# Patient Record
Sex: FEMALE | Race: WHITE
[De-identification: names, ages, dates, MRNs, and addresses within clinical notes are randomized per-mention and may not be internally consistent; named-entity substitution may affect disease eponyms.]

---

## 2021-12-13 ENCOUNTER — HOSPITAL ENCOUNTER (OUTPATIENT)
Age: 61
End: 2021-12-13
Payer: MEDICARE

## 2021-12-13 DIAGNOSIS — F17.210: Primary | ICD-10-CM

## 2021-12-13 DIAGNOSIS — Z12.2: ICD-10-CM

## 2021-12-13 PROCEDURE — 71271 CT THORAX LUNG CANCER SCR C-: CPT

## 2021-12-22 ENCOUNTER — HOSPITAL ENCOUNTER (OUTPATIENT)
Age: 61
End: 2021-12-22
Payer: MEDICARE

## 2021-12-22 DIAGNOSIS — Z12.31: Primary | ICD-10-CM

## 2021-12-22 PROCEDURE — 77063 BREAST TOMOSYNTHESIS BI: CPT

## 2021-12-22 PROCEDURE — 77067 SCR MAMMO BI INCL CAD: CPT

## 2022-07-08 ENCOUNTER — HOSPITAL ENCOUNTER (OUTPATIENT)
Dept: HOSPITAL 100 - LAB | Age: 62
Discharge: HOME | End: 2022-07-08
Payer: COMMERCIAL

## 2022-07-08 DIAGNOSIS — G62.9: Primary | ICD-10-CM

## 2022-07-08 LAB
ALANINE AMINOTRANSFER ALT/SGPT: 19 U/L (ref 13–56)
ALBUMIN SERPL-MCNC: 4 G/DL (ref 3.2–5)
ALKALINE PHOSPHATASE: 68 U/L (ref 45–117)
ANION GAP: 1 (ref 5–15)
AST(SGOT): 13 U/L (ref 15–37)
BUN SERPL-MCNC: 7 MG/DL (ref 7–18)
BUN/CREAT RATIO: 9.1 RATIO (ref 10–20)
CALCIUM SERPL-MCNC: 9.1 MG/DL (ref 8.5–10.1)
CARBON DIOXIDE: 33 MMOL/L (ref 21–32)
CHLORIDE: 105 MMOL/L (ref 98–107)
CHOLEST SERPL-MCNC: 181 MG/DL
DEPRECATED RDW RBC: 40 FL (ref 35.1–43.9)
ERYTHROCYTE [DISTWIDTH] IN BLOOD: 11.7 % (ref 11.6–14.6)
EST GLOM FILT RATE - AFR AMER: 97 ML/MIN (ref 60–?)
FOLATES, (FOLIC ACID): 29.2 NG/ML (ref 3.1–55.4)
GLOBULIN: 2.8 G/DL (ref 2.2–4.2)
GLUCOSE: 99 MG/DL (ref 74–106)
HCT VFR BLD AUTO: 44.1 % (ref 37–47)
HEMOGLOBIN: 15.1 G/DL (ref 12–15)
HGB BLD-MCNC: 15.1 G/DL (ref 12–15)
MCV RBC: 92.8 FL (ref 81–99)
MEAN CORP HGB CONC: 34.2 G/DL (ref 32–36)
MEAN PLATELET VOL.: 10.2 FL (ref 6.2–12)
PLATELET # BLD: 246 K/MM3 (ref 150–450)
PLATELET COUNT: 246 K/MM3 (ref 150–450)
POTASSIUM: 4.3 MMOL/L (ref 3.5–5.1)
RBC # BLD AUTO: 4.75 M/MM3 (ref 4.2–5.4)
RBC DISTRIBUTION WIDTH CV: 11.7 % (ref 11.6–14.6)
RBC DISTRIBUTION WIDTH SD: 40 FL (ref 35.1–43.9)
TRIGLYCERIDES: 89 MG/DL
VLDLC SERPL-MCNC: 18 MG/DL (ref 5–40)
WBC # BLD AUTO: 5.8 K/MM3 (ref 4.4–11)
WHITE BLOOD COUNT: 5.8 K/MM3 (ref 4.4–11)

## 2022-07-08 PROCEDURE — 80061 LIPID PANEL: CPT

## 2022-07-08 PROCEDURE — 85027 COMPLETE CBC AUTOMATED: CPT

## 2022-07-08 PROCEDURE — 81241 F5 GENE: CPT

## 2022-07-08 PROCEDURE — 82746 ASSAY OF FOLIC ACID SERUM: CPT

## 2022-07-08 PROCEDURE — 80053 COMPREHEN METABOLIC PANEL: CPT

## 2022-07-08 PROCEDURE — 84425 ASSAY OF VITAMIN B-1: CPT

## 2022-07-08 PROCEDURE — 83883 ASSAY NEPHELOMETRY NOT SPEC: CPT

## 2022-07-08 PROCEDURE — 36415 COLL VENOUS BLD VENIPUNCTURE: CPT

## 2022-07-15 LAB — VITAMIN B1, THIAMINE: 213.1 NMOL/L (ref 66.5–200)

## 2022-07-25 ENCOUNTER — HOSPITAL ENCOUNTER (OUTPATIENT)
Dept: HOSPITAL 100 - MRI | Age: 62
Discharge: HOME | End: 2022-07-25
Payer: MEDICARE

## 2022-07-25 DIAGNOSIS — G25.0: Primary | ICD-10-CM

## 2022-07-25 DIAGNOSIS — Z86.79: ICD-10-CM

## 2022-07-25 PROCEDURE — A9575 INJ GADOTERATE MEGLUMI 0.1ML: HCPCS

## 2022-07-25 PROCEDURE — 70553 MRI BRAIN STEM W/O & W/DYE: CPT

## 2022-08-19 ENCOUNTER — HOSPITAL ENCOUNTER (EMERGENCY)
Age: 62
Discharge: HOME | End: 2022-08-19
Payer: MEDICARE

## 2022-08-19 VITALS
SYSTOLIC BLOOD PRESSURE: 118 MMHG | HEART RATE: 89 BPM | DIASTOLIC BLOOD PRESSURE: 97 MMHG | RESPIRATION RATE: 16 BRPM | TEMPERATURE: 98.06 F | OXYGEN SATURATION: 98 %

## 2022-08-19 VITALS
RESPIRATION RATE: 22 BRPM | OXYGEN SATURATION: 97 % | SYSTOLIC BLOOD PRESSURE: 119 MMHG | DIASTOLIC BLOOD PRESSURE: 86 MMHG | HEART RATE: 94 BPM | TEMPERATURE: 98.1 F

## 2022-08-19 VITALS
SYSTOLIC BLOOD PRESSURE: 119 MMHG | TEMPERATURE: 98.06 F | OXYGEN SATURATION: 97 % | RESPIRATION RATE: 22 BRPM | HEART RATE: 94 BPM | DIASTOLIC BLOOD PRESSURE: 86 MMHG

## 2022-08-19 VITALS — RESPIRATION RATE: 24 BRPM | HEART RATE: 106 BPM

## 2022-08-19 VITALS
DIASTOLIC BLOOD PRESSURE: 73 MMHG | RESPIRATION RATE: 18 BRPM | SYSTOLIC BLOOD PRESSURE: 121 MMHG | OXYGEN SATURATION: 93 % | HEART RATE: 93 BPM

## 2022-08-19 VITALS
HEART RATE: 89 BPM | OXYGEN SATURATION: 98 % | RESPIRATION RATE: 19 BRPM | DIASTOLIC BLOOD PRESSURE: 85 MMHG | SYSTOLIC BLOOD PRESSURE: 136 MMHG

## 2022-08-19 VITALS — OXYGEN SATURATION: 94 %

## 2022-08-19 VITALS — BODY MASS INDEX: 21 KG/M2

## 2022-08-19 VITALS — OXYGEN SATURATION: 96 %

## 2022-08-19 DIAGNOSIS — F41.1: ICD-10-CM

## 2022-08-19 DIAGNOSIS — R09.02: ICD-10-CM

## 2022-08-19 DIAGNOSIS — K58.9: ICD-10-CM

## 2022-08-19 DIAGNOSIS — Z79.899: ICD-10-CM

## 2022-08-19 DIAGNOSIS — F17.210: ICD-10-CM

## 2022-08-19 DIAGNOSIS — M51.36: ICD-10-CM

## 2022-08-19 DIAGNOSIS — M50.30: ICD-10-CM

## 2022-08-19 DIAGNOSIS — J44.1: Primary | ICD-10-CM

## 2022-08-19 DIAGNOSIS — M79.7: ICD-10-CM

## 2022-08-19 DIAGNOSIS — G62.9: ICD-10-CM

## 2022-08-19 DIAGNOSIS — Z86.73: ICD-10-CM

## 2022-08-19 DIAGNOSIS — U07.1: ICD-10-CM

## 2022-08-19 LAB
ANION GAP: 6 (ref 5–15)
BUN SERPL-MCNC: 8 MG/DL (ref 7–18)
BUN/CREAT RATIO: 10.9 RATIO (ref 10–20)
CALCIUM SERPL-MCNC: 9 MG/DL (ref 8.5–10.1)
CARBON DIOXIDE: 29 MMOL/L (ref 21–32)
CHLORIDE: 101 MMOL/L (ref 98–107)
D-DIMER QUANTITATIVE (DVT/PE): 0.42 FEU/UG/M (ref 0.27–0.49)
DEPRECATED RDW RBC: 40.1 FL (ref 35.1–43.9)
ERYTHROCYTE [DISTWIDTH] IN BLOOD: 11.9 % (ref 11.6–14.6)
EST GLOM FILT RATE - AFR AMER: 102 ML/MIN (ref 60–?)
ESTIMATED CREATININE CLEARANCE: 73.96 ML/MIN
GLUCOSE: 119 MG/DL (ref 74–106)
HCT VFR BLD AUTO: 42.5 % (ref 37–47)
HEMOGLOBIN: 14.9 G/DL (ref 12–15)
HGB BLD-MCNC: 14.9 G/DL (ref 12–15)
IMMATURE GRANULOCYTES COUNT: 0.02 X10^3/UL (ref 0–0)
MCV RBC: 92.4 FL (ref 81–99)
MEAN CORP HGB CONC: 35.1 G/DL (ref 32–36)
MEAN PLATELET VOL.: 10.4 FL (ref 6.2–12)
NRBC FLAGGED BY ANALYZER: 0 % (ref 0–5)
PLATELET # BLD: 182 K/MM3 (ref 150–450)
PLATELET COUNT: 182 K/MM3 (ref 150–450)
POTASSIUM: 4 MMOL/L (ref 3.5–5.1)
RBC # BLD AUTO: 4.6 M/MM3 (ref 4.2–5.4)
RBC DISTRIBUTION WIDTH CV: 11.9 % (ref 11.6–14.6)
RBC DISTRIBUTION WIDTH SD: 40.1 FL (ref 35.1–43.9)
TROPONIN-I HS: 4 PG/ML (ref 3–54)
WBC # BLD AUTO: 4.6 K/MM3 (ref 4.4–11)
WHITE BLOOD COUNT: 4.6 K/MM3 (ref 4.4–11)

## 2022-08-19 PROCEDURE — 85379 FIBRIN DEGRADATION QUANT: CPT

## 2022-08-19 PROCEDURE — 93005 ELECTROCARDIOGRAM TRACING: CPT

## 2022-08-19 PROCEDURE — 85025 COMPLETE CBC W/AUTO DIFF WBC: CPT

## 2022-08-19 PROCEDURE — 71045 X-RAY EXAM CHEST 1 VIEW: CPT

## 2022-08-19 PROCEDURE — 96361 HYDRATE IV INFUSION ADD-ON: CPT

## 2022-08-19 PROCEDURE — 80048 BASIC METABOLIC PNL TOTAL CA: CPT

## 2022-08-19 PROCEDURE — 94640 AIRWAY INHALATION TREATMENT: CPT

## 2022-08-19 PROCEDURE — 99285 EMERGENCY DEPT VISIT HI MDM: CPT

## 2022-08-19 PROCEDURE — 84484 ASSAY OF TROPONIN QUANT: CPT

## 2022-08-19 PROCEDURE — A4216 STERILE WATER/SALINE, 10 ML: HCPCS

## 2022-08-19 PROCEDURE — 96374 THER/PROPH/DIAG INJ IV PUSH: CPT

## 2023-01-27 ENCOUNTER — HOSPITAL ENCOUNTER (OUTPATIENT)
Dept: HOSPITAL 100 - BFHLAB | Age: 63
Discharge: HOME | End: 2023-01-27
Payer: MEDICARE

## 2023-01-27 DIAGNOSIS — F17.200: ICD-10-CM

## 2023-01-27 DIAGNOSIS — J44.9: ICD-10-CM

## 2023-01-27 DIAGNOSIS — Z00.00: Primary | ICD-10-CM

## 2023-01-27 DIAGNOSIS — E55.9: ICD-10-CM

## 2023-01-27 LAB
ALANINE AMINOTRANSFER ALT/SGPT: 28 U/L (ref 13–56)
ALBUMIN SERPL-MCNC: 4.1 G/DL (ref 3.2–5)
ALKALINE PHOSPHATASE: 70 U/L (ref 45–117)
ANION GAP: 7 (ref 5–15)
AST(SGOT): 18 U/L (ref 15–37)
BUN SERPL-MCNC: 8 MG/DL (ref 7–18)
BUN/CREAT RATIO: 10.4 RATIO (ref 10–20)
CALCIUM SERPL-MCNC: 9 MG/DL (ref 8.5–10.1)
CARBON DIOXIDE: 30 MMOL/L (ref 21–32)
CHLORIDE: 102 MMOL/L (ref 98–107)
DEPRECATED RDW RBC: 39.8 FL (ref 35.1–43.9)
ERYTHROCYTE [DISTWIDTH] IN BLOOD: 11.6 % (ref 11.6–14.6)
EST GLOM FILT RATE - AFR AMER: 97 ML/MIN (ref 60–?)
GLOBULIN: 2.6 G/DL (ref 2.2–4.2)
GLUCOSE: 94 MG/DL (ref 74–106)
HCT VFR BLD AUTO: 44.2 % (ref 37–47)
HEMOGLOBIN: 14.6 G/DL (ref 12–15)
HGB BLD-MCNC: 14.6 G/DL (ref 12–15)
IMMATURE GRANULOCYTES COUNT: 0.01 X10^3/UL (ref 0–0)
MCV RBC: 92.9 FL (ref 81–99)
MEAN CORP HGB CONC: 33 G/DL (ref 32–36)
MEAN PLATELET VOL.: 10.9 FL (ref 6.2–12)
NRBC FLAGGED BY ANALYZER: 0 % (ref 0–5)
PLATELET # BLD: 261 K/MM3 (ref 150–450)
PLATELET COUNT: 261 K/MM3 (ref 150–450)
POTASSIUM: 4 MMOL/L (ref 3.5–5.1)
RBC # BLD AUTO: 4.76 M/MM3 (ref 4.2–5.4)
RBC DISTRIBUTION WIDTH CV: 11.6 % (ref 11.6–14.6)
RBC DISTRIBUTION WIDTH SD: 39.8 FL (ref 35.1–43.9)
VITAMIN D,25 HYDROXY: 46 NG/ML
WBC # BLD AUTO: 6.3 K/MM3 (ref 4.4–11)
WHITE BLOOD COUNT: 6.3 K/MM3 (ref 4.4–11)

## 2023-01-27 PROCEDURE — 85025 COMPLETE CBC W/AUTO DIFF WBC: CPT

## 2023-01-27 PROCEDURE — 36415 COLL VENOUS BLD VENIPUNCTURE: CPT

## 2023-01-27 PROCEDURE — 80053 COMPREHEN METABOLIC PANEL: CPT

## 2023-01-27 PROCEDURE — 82306 VITAMIN D 25 HYDROXY: CPT

## 2023-02-13 ENCOUNTER — HOSPITAL ENCOUNTER (OUTPATIENT)
Dept: HOSPITAL 100 - PSN | Age: 63
Discharge: HOME | End: 2023-02-13
Payer: MEDICARE

## 2023-02-13 DIAGNOSIS — J44.9: Primary | ICD-10-CM

## 2023-02-13 PROCEDURE — 94726 PLETHYSMOGRAPHY LUNG VOLUMES: CPT

## 2023-02-13 PROCEDURE — 94060 EVALUATION OF WHEEZING: CPT

## 2023-02-13 PROCEDURE — 94729 DIFFUSING CAPACITY: CPT

## 2023-02-14 ENCOUNTER — HOSPITAL ENCOUNTER (EMERGENCY)
Age: 63
Discharge: HOME | End: 2023-02-14
Payer: MEDICARE

## 2023-02-14 VITALS — DIASTOLIC BLOOD PRESSURE: 84 MMHG | SYSTOLIC BLOOD PRESSURE: 123 MMHG | HEART RATE: 116 BPM

## 2023-02-14 VITALS — RESPIRATION RATE: 20 BRPM | OXYGEN SATURATION: 96 % | HEART RATE: 117 BPM

## 2023-02-14 VITALS
RESPIRATION RATE: 17 BRPM | SYSTOLIC BLOOD PRESSURE: 110 MMHG | OXYGEN SATURATION: 99 % | DIASTOLIC BLOOD PRESSURE: 69 MMHG | HEART RATE: 74 BPM

## 2023-02-14 VITALS
DIASTOLIC BLOOD PRESSURE: 73 MMHG | TEMPERATURE: 97.52 F | OXYGEN SATURATION: 97 % | HEART RATE: 119 BPM | RESPIRATION RATE: 31 BRPM | SYSTOLIC BLOOD PRESSURE: 129 MMHG | BODY MASS INDEX: 23.4 KG/M2

## 2023-02-14 VITALS — OXYGEN SATURATION: 96 %

## 2023-02-14 DIAGNOSIS — J44.1: Primary | ICD-10-CM

## 2023-02-14 DIAGNOSIS — F17.210: ICD-10-CM

## 2023-02-14 DIAGNOSIS — R06.00: ICD-10-CM

## 2023-02-14 DIAGNOSIS — Z86.73: ICD-10-CM

## 2023-02-14 LAB
ANION GAP: 8 (ref 5–15)
BNP,B-TYPE NATRIURETIC PEPTIDE: 16.9 PG/ML (ref 0–100)
BUN SERPL-MCNC: 15 MG/DL (ref 7–18)
BUN/CREAT RATIO: 13.8 RATIO (ref 10–20)
CALCIUM SERPL-MCNC: 9.3 MG/DL (ref 8.5–10.1)
CARBON DIOXIDE: 28 MMOL/L (ref 21–32)
CHLORIDE: 105 MMOL/L (ref 98–107)
D-DIMER QUANTITATIVE (DVT/PE): 0.35 FEU/UG/M (ref 0.27–0.49)
DEPRECATED RDW RBC: 39.7 FL (ref 35.1–43.9)
ERYTHROCYTE [DISTWIDTH] IN BLOOD: 11.7 % (ref 11.6–14.6)
EST GLOM FILT RATE - AFR AMER: 65 ML/MIN (ref 60–?)
ESTIMATED CREATININE CLEARANCE: 50.04 ML/MIN
GLUCOSE: 193 MG/DL (ref 74–106)
HCT VFR BLD AUTO: 44 % (ref 37–47)
HEMOGLOBIN: 14.8 G/DL (ref 12–15)
HGB BLD-MCNC: 14.8 G/DL (ref 12–15)
IMMATURE GRANULOCYTES COUNT: 0.05 X10^3/UL (ref 0–0)
MCV RBC: 92.1 FL (ref 81–99)
MEAN CORP HGB CONC: 33.6 G/DL (ref 32–36)
MEAN PLATELET VOL.: 10.8 FL (ref 6.2–12)
NRBC FLAGGED BY ANALYZER: 0 % (ref 0–5)
PLATELET # BLD: 283 K/MM3 (ref 150–450)
PLATELET COUNT: 283 K/MM3 (ref 150–450)
POTASSIUM: 3.9 MMOL/L (ref 3.5–5.1)
RBC # BLD AUTO: 4.78 M/MM3 (ref 4.2–5.4)
RBC DISTRIBUTION WIDTH CV: 11.7 % (ref 11.6–14.6)
RBC DISTRIBUTION WIDTH SD: 39.7 FL (ref 35.1–43.9)
TROPONIN-I HS: 8 PG/ML (ref 3–54)
WBC # BLD AUTO: 10.8 K/MM3 (ref 4.4–11)
WHITE BLOOD COUNT: 10.8 K/MM3 (ref 4.4–11)

## 2023-02-14 PROCEDURE — 93005 ELECTROCARDIOGRAM TRACING: CPT

## 2023-02-14 PROCEDURE — 85379 FIBRIN DEGRADATION QUANT: CPT

## 2023-02-14 PROCEDURE — 99252 IP/OBS CONSLTJ NEW/EST SF 35: CPT

## 2023-02-14 PROCEDURE — 71046 X-RAY EXAM CHEST 2 VIEWS: CPT

## 2023-02-14 PROCEDURE — 99285 EMERGENCY DEPT VISIT HI MDM: CPT

## 2023-02-14 PROCEDURE — 85025 COMPLETE CBC W/AUTO DIFF WBC: CPT

## 2023-02-14 PROCEDURE — 94640 AIRWAY INHALATION TREATMENT: CPT

## 2023-02-14 PROCEDURE — 87428 SARSCOV & INF VIR A&B AG IA: CPT

## 2023-02-14 PROCEDURE — G0463 HOSPITAL OUTPT CLINIC VISIT: HCPCS

## 2023-02-14 PROCEDURE — 80048 BASIC METABOLIC PNL TOTAL CA: CPT

## 2023-02-14 PROCEDURE — 84484 ASSAY OF TROPONIN QUANT: CPT

## 2023-02-14 PROCEDURE — 83880 ASSAY OF NATRIURETIC PEPTIDE: CPT

## 2023-02-15 ENCOUNTER — HOSPITAL ENCOUNTER (OUTPATIENT)
Dept: HOSPITAL 100 - PSN | Age: 63
Discharge: HOME | End: 2023-02-15
Payer: MEDICARE

## 2023-02-15 VITALS — OXYGEN SATURATION: 98 % | HEART RATE: 116 BPM

## 2023-02-15 DIAGNOSIS — J44.9: ICD-10-CM

## 2023-02-15 DIAGNOSIS — F17.210: Primary | ICD-10-CM

## 2023-02-15 PROCEDURE — 94618 PULMONARY STRESS TESTING: CPT

## 2023-02-15 PROCEDURE — 71271 CT THORAX LUNG CANCER SCR C-: CPT

## 2023-03-17 ENCOUNTER — HOSPITAL ENCOUNTER (OUTPATIENT)
Dept: HOSPITAL 100 - PSN | Age: 63
Discharge: HOME | End: 2023-03-17
Payer: MEDICARE

## 2023-03-17 DIAGNOSIS — R00.0: Primary | ICD-10-CM

## 2023-03-17 PROCEDURE — 93225 XTRNL ECG REC<48 HRS REC: CPT

## 2023-03-17 PROCEDURE — 93226 XTRNL ECG REC<48 HR SCAN A/R: CPT

## 2023-12-11 ENCOUNTER — HOSPITAL ENCOUNTER (OUTPATIENT)
Age: 63
Discharge: HOME | End: 2023-12-11
Payer: MEDICARE

## 2023-12-11 DIAGNOSIS — R07.9: Primary | ICD-10-CM

## 2023-12-11 DIAGNOSIS — R06.09: ICD-10-CM

## 2023-12-11 LAB
ALANINE AMINOTRANSFER ALT/SGPT: 22 U/L (ref 13–56)
ALBUMIN SERPL-MCNC: 4 G/DL (ref 3.2–5)
ALKALINE PHOSPHATASE: 69 U/L (ref 45–117)
ANION GAP: 6 (ref 5–15)
AST(SGOT): 15 U/L (ref 15–37)
BUN SERPL-MCNC: 7 MG/DL (ref 7–18)
BUN/CREAT RATIO: 8.5 RATIO (ref 10–20)
CALCIUM SERPL-MCNC: 9.2 MG/DL (ref 8.5–10.1)
CARBON DIOXIDE: 29 MMOL/L (ref 21–32)
CHLORIDE: 103 MMOL/L (ref 98–107)
CHOLEST SERPL-MCNC: 185 MG/DL
DEPRECATED RDW RBC: 41.7 FL (ref 35.1–43.9)
ERYTHROCYTE [DISTWIDTH] IN BLOOD: 11.9 % (ref 11.6–14.6)
EST GLOM FILT RATE - AFR AMER: 90 ML/MIN (ref 60–?)
GLOBULIN: 2.8 G/DL (ref 2.2–4.2)
GLUCOSE: 103 MG/DL (ref 74–106)
HCT VFR BLD AUTO: 46.5 % (ref 37–47)
HEMOGLOBIN: 15.3 G/DL (ref 12–15)
HGB BLD-MCNC: 15.3 G/DL (ref 12–15)
IMMATURE GRANULOCYTES COUNT: 0.04 X10^3/UL (ref 0–0)
MCV RBC: 94.1 FL (ref 81–99)
MEAN CORP HGB CONC: 32.9 G/DL (ref 32–36)
MEAN PLATELET VOL.: 9.8 FL (ref 6.2–12)
NRBC FLAGGED BY ANALYZER: 0 % (ref 0–5)
PLATELET # BLD: 274 K/MM3 (ref 150–450)
PLATELET COUNT: 274 K/MM3 (ref 150–450)
POTASSIUM: 4.5 MMOL/L (ref 3.5–5.1)
RBC # BLD AUTO: 4.94 M/MM3 (ref 4.2–5.4)
RBC DISTRIBUTION WIDTH CV: 11.9 % (ref 11.6–14.6)
RBC DISTRIBUTION WIDTH SD: 41.7 FL (ref 35.1–43.9)
TRIGLYCERIDES: 71 MG/DL
VLDLC SERPL-MCNC: 14 MG/DL (ref 5–40)
WBC # BLD AUTO: 7.3 K/MM3 (ref 4.4–11)
WHITE BLOOD COUNT: 7.3 K/MM3 (ref 4.4–11)

## 2023-12-11 PROCEDURE — 80053 COMPREHEN METABOLIC PANEL: CPT

## 2023-12-11 PROCEDURE — 85025 COMPLETE CBC W/AUTO DIFF WBC: CPT

## 2023-12-11 PROCEDURE — 93017 CV STRESS TEST TRACING ONLY: CPT

## 2023-12-11 PROCEDURE — 93350 STRESS TTE ONLY: CPT

## 2023-12-11 PROCEDURE — C8928 TTE W OR W/O FOL W/CON,STRES: HCPCS

## 2023-12-11 PROCEDURE — 36415 COLL VENOUS BLD VENIPUNCTURE: CPT

## 2023-12-11 PROCEDURE — A4216 STERILE WATER/SALINE, 10 ML: HCPCS

## 2023-12-11 PROCEDURE — 80061 LIPID PANEL: CPT

## 2024-01-29 ENCOUNTER — HOSPITAL ENCOUNTER (OUTPATIENT)
Dept: HOSPITAL 100 - RAD | Age: 64
Discharge: HOME | End: 2024-01-29
Payer: MEDICARE

## 2024-01-29 DIAGNOSIS — R07.9: ICD-10-CM

## 2024-01-29 DIAGNOSIS — R94.39: Primary | ICD-10-CM

## 2024-01-29 DIAGNOSIS — R06.02: ICD-10-CM

## 2024-01-29 LAB
ANION GAP: 5 (ref 5–15)
BUN SERPL-MCNC: 5 MG/DL (ref 7–18)
BUN/CREAT RATIO: 5.8 RATIO (ref 10–20)
CALCIUM SERPL-MCNC: 9.8 MG/DL (ref 8.5–10.1)
CARBON DIOXIDE: 28 MMOL/L (ref 21–32)
CHLORIDE: 100 MMOL/L (ref 98–107)
DEPRECATED RDW RBC: 38.7 FL (ref 35.1–43.9)
ERYTHROCYTE [DISTWIDTH] IN BLOOD: 11.6 % (ref 11.6–14.6)
EST GLOM FILT RATE - AFR AMER: 85 ML/MIN (ref 60–?)
GLUCOSE: 122 MG/DL (ref 74–106)
HCT VFR BLD AUTO: 45 % (ref 37–47)
HGB BLD-MCNC: 15.3 G/DL (ref 12–15)
IMMATURE GRANULOCYTES COUNT: 0.12 X10^3/UL (ref 0–0)
MCV RBC: 91.1 FL (ref 81–99)
MEAN CORP HGB CONC: 34 G/DL (ref 32–36)
MEAN PLATELET VOL.: 10.2 FL (ref 6.2–12)
NRBC FLAGGED BY ANALYZER: 0 % (ref 0–5)
PLATELET # BLD: 353 K/MM3 (ref 150–450)
POTASSIUM: 4.1 MMOL/L (ref 3.5–5.1)
RBC # BLD AUTO: 4.94 M/MM3 (ref 4.2–5.4)
WBC # BLD AUTO: 11.5 K/MM3 (ref 4.4–11)

## 2024-01-29 PROCEDURE — 80048 BASIC METABOLIC PNL TOTAL CA: CPT

## 2024-01-29 PROCEDURE — 36415 COLL VENOUS BLD VENIPUNCTURE: CPT

## 2024-01-29 PROCEDURE — 71046 X-RAY EXAM CHEST 2 VIEWS: CPT

## 2024-01-29 PROCEDURE — 85025 COMPLETE CBC W/AUTO DIFF WBC: CPT

## 2024-01-29 NOTE — RAD_ITS
REPORT-ID:[id number] 
 
EXAM:  XR CHEST, 2 VIEWS 
 
CLINICAL INDICATION:  sob -- for heart cath 
 
TECHNIQUE:  Frontal and lateral views of the chest. 
 
COMPARISON:  2/14/2023 
 
FINDINGS: 
 
LUNGS AND PLEURAL SPACES:  Lungs are hyperinflated. There are emphysematous 
changes in the apices.  No pneumothorax.  No effusion. 
 
HEART:  Unremarkable.  Cardiac silhouette not enlarged. 
 
MEDIASTINUM:  Central airways and mediastinal contour are unremarkable. 
 
BONES/JOINTS:  There is orthopedic hardware overlying the cervical spine. 
No acute fracture. 
 
SOFT TISSUES:  Unremarkable. 
 
ORDER #: [id number] RAD/Chest PA and Lateral  
IMPRESSION:  
 
  
Pulmonary hyperinflation with emphysematous change in the lung apices.  
There is no acute abnormality.  
 
  
Electronically Signed:  
Anthony Chase MD  
2024/01/29 at 23:16 EST  
Reading Location ID and State: 1414 / [place]  
Tel [phone], Service support  [phone], Fax [phone]

## 2024-01-29 NOTE — XMS RPT_ITS
Comprehensive CCD (C-CDA v2.1)  
  
                          Created on: 2024  
  
  
LIZBETH DE LA CRUZ  
External Reference #: CDR,PersonID:[id number]  
: 1960  
Sex: Female  
  
Demographics  
  
  
                                        Address             [address] [place] [address]  [address]  
   
                                        Home Phone          [phone]  
   
                                        Preferred Language  en  
   
                                        Marital Status      Single  
   
                                        Anabaptism Affiliation Unknown  
   
                                        Race                Unknown  
   
                                        Ethnic Group        Unknown  
  
  
Author  
  
  
                                        Name                Unknown  
   
                                        Address             [address] [place] [address]  
[address] [place] [address]  [address]  
   
                                        Phone               [phone]  
   
                                        Organization        [facility]  
  
  
Care Team Providers  
  
  
                                Care Team Member Name Role            Phone  
   
                                SHEFALI PEARSON Attending       [facility]    Unavailable  
  
  
  
Allergies  
  
  
                                                    Allergy   
Classification                          Reported   
Allergen(s)               Allergy Type              Date of   
Onset                     Reaction(s)               Facility  
   
                                                      
(1 source)                              NSAIDs;   
Translations:   
[NSAIDS   
(NON-STEROIDAL   
ANTI-INFLAMMATOR  
Y DRUG)]                                Propensity to   
adverse   
reactions to   
drug (disorder)                                                                            [facility]  
  
  
  
Results  
  
  
                      Test Name  Value      Interpretation Reference Range Facil  
ity  
  
  
  
                                          
   
                                          
   
                                          
  
  
  
Encounters  
  
  
                          Encounter Date Encounter Type Care Provider Facility  
   
                                                    Start: 2022  
End: 2022                         Emergency department   
patient visit                           SHEFALI PEARSON                                [facility]  
  
  
  
Payers  
  
  
                          Date         Payer Category Payer        Policy ID  
   
                          2022   Medicaid                  [id number]  
   
                          2022   Medicare                  [id number]  
   
                          1960   Unknown                   [id number] 2.16.  
[id number]  
  
  
  
Summary Purpose  
  
  
                                                      
  
  
  
Family History  
No Family History Records Found  
  
Advance Directives  
No Advanced Directives Records Found  
  
Additional Source Comments  
  
  
  
                                                    INFORMATION SOURCE (unrecogn  
ized section and content)  
   
                                          
  
  
  
                                          
   
                                          
  
  
FOR RECORDS PERTAINING TO PATIENTS WHO ARE OR HAVE BEEN ENROLLED IN A CHEMICAL 
DEPENDENCY/SUBSTANCEABUSE PROGRAM, SOME INFORMATION MAY BE OMITTED. This 
clinical summary was aggregated from multiple sources. Caution should be 
exercised in using it in the provision of clinical care. This summary normalizes
information from multiple sources, and as a consequence, information in this 
document may materially change the coding, format and clinical context of 
patient data. In addition, data may be omitted in some cases. CLINICAL DECISIONS
SHOULD BE BASED ON THE PRIMARY CLINICAL RECORDS. [facility] [employer] [facility]. provides 
no warranty or guarantee of the accuracy or completeness of information in this 
document.

## 2024-02-07 ENCOUNTER — HOSPITAL ENCOUNTER (OUTPATIENT)
Dept: HOSPITAL 100 - CLSP | Age: 64
Discharge: HOME | End: 2024-02-07
Payer: MEDICARE

## 2024-02-07 VITALS — BODY MASS INDEX: 23.6 KG/M2

## 2024-02-07 DIAGNOSIS — Z86.16: ICD-10-CM

## 2024-02-07 DIAGNOSIS — F17.210: ICD-10-CM

## 2024-02-07 DIAGNOSIS — I25.10: Primary | ICD-10-CM

## 2024-02-07 DIAGNOSIS — Z79.82: ICD-10-CM

## 2024-02-07 DIAGNOSIS — Z79.899: ICD-10-CM

## 2024-02-07 DIAGNOSIS — F41.1: ICD-10-CM

## 2024-02-07 DIAGNOSIS — F32.A: ICD-10-CM

## 2024-02-07 DIAGNOSIS — J44.9: ICD-10-CM

## 2024-02-07 PROCEDURE — 99153 MOD SED SAME PHYS/QHP EA: CPT

## 2024-02-07 PROCEDURE — 99152 MOD SED SAME PHYS/QHP 5/>YRS: CPT

## 2024-02-07 PROCEDURE — 93454 CORONARY ARTERY ANGIO S&I: CPT

## 2024-02-07 PROCEDURE — C1894 INTRO/SHEATH, NON-LASER: HCPCS

## 2024-02-07 PROCEDURE — C1769 GUIDE WIRE: HCPCS

## 2024-02-09 ENCOUNTER — HOSPITAL ENCOUNTER (OUTPATIENT)
Dept: HOSPITAL 100 - LABSPEC | Age: 64
Discharge: HOME | End: 2024-02-09
Payer: MEDICARE

## 2024-02-09 DIAGNOSIS — Z12.4: Primary | ICD-10-CM

## 2024-02-09 PROCEDURE — G0145 SCR C/V CYTO,THINLAYER,RESCR: HCPCS

## 2024-02-09 PROCEDURE — 88175 CYTOPATH C/V AUTO FLUID REDO: CPT

## 2024-03-15 ENCOUNTER — HOSPITAL ENCOUNTER (OUTPATIENT)
Dept: HOSPITAL 100 - CVS | Age: 64
Discharge: HOME | End: 2024-03-15
Payer: MEDICARE

## 2024-03-15 DIAGNOSIS — R07.9: ICD-10-CM

## 2024-03-15 DIAGNOSIS — R06.02: ICD-10-CM

## 2024-03-15 DIAGNOSIS — R94.31: Primary | ICD-10-CM

## 2024-03-15 DIAGNOSIS — F17.200: ICD-10-CM

## 2024-03-15 PROCEDURE — 93306 TTE W/DOPPLER COMPLETE: CPT

## 2024-03-15 PROCEDURE — A4216 STERILE WATER/SALINE, 10 ML: HCPCS

## 2024-03-15 NOTE — XMS RPT_ITS
Comprehensive CCD (C-CDA v2.1)  
  
                           Created on: March 15, 2024  
  
  
JONONALINITY  
External Reference #: CDR,PersonID:[id number]  
: 1960  
Sex: Female  
  
Demographics  
  
  
                                        Address             [address] [place] [address]  [address]  
   
                                        Home Phone          [phone]  
   
                                        Preferred Language  en  
   
                                        Marital Status      Single  
   
                                        [employer] Affiliation Unknown  
   
                                        Race                Unknown  
   
                                        Ethnic Group        Unknown  
  
  
Author  
  
  
                                        Name                Unknown  
   
                                        Address             [address] [place] [address]  
[address] [place] [address]  [address]  
   
                                        Phone               [phone]  
   
                                        Organization        [facility]  
  
  
Care Team Providers  
  
  
                                Care Team Member Name Role            Phone  
   
                                SHEFALI PEARSON Attending       [facility]    Unavailable  
  
  
  
Allergies  
  
  
                                                    Allergy   
Classification                          Reported   
Allergen(s)               Allergy Type              Date of   
Onset                     Reaction(s)               Facility  
   
                                                      
(1 source)                              NSAIDs;   
Translations:   
[NSAIDS   
(NON-STEROIDAL   
ANTI-INFLAMMATOR  
Y DRUG)]                                Propensity to   
adverse   
reactions to   
drug (disorder)                                                                            [facility] [place] [facility]  
  
  
  
Results  
  
  
                      Test Name  Value      Interpretation Reference Range Facil  
ity  
  
  
  
                                          
   
                                          
   
                                          
  
  
  
Encounters  
  
  
                          Encounter Date Encounter Type Care Provider Facility  
   
                                                    Start: 2022  
End: 2022                         Emergency department   
patient visit                           SHEFALI PEARSON                                [facility]  
  
  
  
Payers  
  
  
                          Date         Payer Category Payer        Policy ID  
   
                          2022   Medicaid                  [id number]  
   
                          2022   Medicare                  [id number]  
   
                          1960   Unknown                   [id number] 2.16.  
[id number]  
  
  
  
Summary Purpose  
  
  
                                                      
  
  
  
Family History  
No Family History Records Found  
  
Advance Directives  
No Advanced Directives Records Found  
  
Additional Source Comments  
  
  
  
                                                    INFORMATION SOURCE (unrecogn  
ized section and content)  
   
                                          
  
  
  
                                          
   
                                          
  
  
FOR RECORDS PERTAINING TO PATIENTS WHO ARE OR HAVE BEEN ENROLLED IN A CHEMICAL 
DEPENDENCY/SUBSTANCEABUSE PROGRAM, SOME INFORMATION MAY BE OMITTED. This 
clinical summary was aggregated from multiple sources. Caution should be 
exercised in using it in the provision of clinical care. This summary normalizes
information from multiple sources, and as a consequence, information in this 
document may materially change the coding, format and clinical context of 
patient data. In addition, data may be omitted in some cases. CLINICAL DECISIONS
SHOULD BE BASED ON THE PRIMARY CLINICAL RECORDS. [facility]. provides 
no warranty or guarantee of the accuracy or completeness of information in this 
document.

## 2024-03-15 NOTE — ECHOD_ITS
Reason For Study: SOB 
 
Procedure 
This was a 2D Doppler, Color Flow transthoracic echocardiogram. Exam performed in department. 
 
Left Ventricle 
Normal size and thickness. The left ventricular ejection fraction is 65 %. Diastolic function is 
indeterminate. 
 
Right Ventricle 
Normal right ventricle. 
 
Atria 
The left and right atria are normal. Aneurysmal atrial septum. Bubble contrast study is negative for 
PFO/ASD. 
 
Mitral Valve 
Minimal prolapse of the posterior mitral leaflet. Trace mitral valve regurgitation. 
 
Tricuspid Valve 
Trivial tricuspid valve insufficiency. Right ventricular systolic pressure estimated to be 48 mmHg. 
 
Aortic Valve 
Aortic sclerosis, no stenosis. 
 
Pulmonic Valve 
The pulmonic valve is not well visualized. 
 
Great Vessels 
Normal sized aortic root. 
 
Pericardium/Pleural 
No pericardial effusion. 
 
Medication 
Performed a rapid injection of agitated mix of 9 cc saline and 1cc air to assess for atrial septal 
defect. 
 
MMode/2D Measurements & Calculations 
LVIDd: 4.3 cm                           IVSd: 0.83 cm               Ao root diam: 2.8 cm 
LVIDs: 2.8 cm                           LVPWd: 0.85 cm 
RVDd: 3.8 cm                            FS: 35.6 % 
 
          _________________________________________________________________________________ 
LAV(MOD-bp): 16.3 ml                    LVAd ap4: 17.5 cm2          SV(MOD-sp4): 29.5 ml 
LAV(MOD-bp) Indexed: 9.1 ml/m2          LVLd ap4: 6.0 cm 
LAV(MOD-sp2): 17.4 ml                   EDV(MOD-sp4): 42.6 ml 
LAV(MOD-sp4): 15.2 ml                   EDV(sp4-el): 43.0 ml 
 
                                        LVAs ap4: 8.8 cm2 
                                        LVLs ap4: 5.0 cm 
                                        ESV(MOD-sp4): 13.2 ml 
                                        ESV(sp4-el): 13.1 ml 
                                        EF(MOD-sp4): 69.1 % 
                                        EF(sp4-el): 69.6 % 
          _________________________________________________________________________________ 
SV(sp4-el): 29.9 ml                     LA A4 area: 9.0 cm2         LA dimension(2D): 2.7 cm 
 
          _________________________________________________________________________________ 
RA A4 area: 9.7 cm2                     TAPSE: 1.9 cm 
 
Time Measurements 
MV dec time: 0.32 sec 
 
Doppler Measurements & Calculations 
MV E max francesco: 48.5 cm/sec        Lat Peak E' Francesco: 9.2 cm/sec       Med Peak E' Francesco: 7.3 cm/sec 
MV A max francesco: 51.9 cm/sec        E/E' lat: 5.2                     E/E' med: 6.6 
MV E/A: 0.94 
          _________________________________________________________________________________ 
MV dec slope: 152.8 cm/sec2      Ao V2 max: 128.0 cm/sec           LV V1 max: 98.8 cm/sec 
                                 Ao max P.5 mmHg               LV V1 max PG: 3.9 mmHg 
                                 Ao V2 mean: 97.0 cm/sec 
                                 Ao mean P.1 mmHg 
                                 Ao V2 VTI: 26.2 cm 
          _________________________________________________________________________________ 
PA V2 max: 73.5 cm/sec           TR max francesco: 288.6 cm/sec 
                                 TR max P.3 mmHg 
 
ORDER #: [id number] ECHO/Echo Complete  
Interpretation Summary  
The left ventricular ejection fraction is 65 %.  
Diastolic function is indeterminate.  
Aneurysmal atrial septum.  
Bubble contrast study is negative for PFO/ASD.  
Minimal prolapse of the posterior mitral leaflet.Trace mitral valve regurgitati
on.  
Right ventricular systolic pressure estimated to be 48 mmHg.  
Aortic sclerosis, no stenosis.  
 
  
 
  
______________________________________________________________________________  
Ordering Physician: Mark Condon  
Referring Physician: Dionne Mansfield  
Performed By: Kaitlynn Velazquez, RDCS, RVT  
Electronically signed by: Mark Condon MD on 03/15/2024 03:32 PM

## 2024-04-26 ENCOUNTER — LAB (OUTPATIENT)
Dept: LAB | Facility: LAB | Age: 64
End: 2024-04-26
Payer: MEDICARE

## 2024-04-26 ENCOUNTER — OFFICE VISIT (OUTPATIENT)
Dept: PULMONOLOGY | Facility: CLINIC | Age: 64
End: 2024-04-26
Payer: MEDICARE

## 2024-04-26 ENCOUNTER — HOSPITAL ENCOUNTER (OUTPATIENT)
Dept: RADIOLOGY | Facility: CLINIC | Age: 64
Discharge: HOME | End: 2024-04-26
Payer: MEDICARE

## 2024-04-26 VITALS
TEMPERATURE: 98.7 F | WEIGHT: 152.8 LBS | HEART RATE: 71 BPM | BODY MASS INDEX: 23.98 KG/M2 | OXYGEN SATURATION: 98 % | SYSTOLIC BLOOD PRESSURE: 105 MMHG | DIASTOLIC BLOOD PRESSURE: 66 MMHG | HEIGHT: 67 IN

## 2024-04-26 DIAGNOSIS — J44.9 CHRONIC OBSTRUCTIVE PULMONARY DISEASE, UNSPECIFIED COPD TYPE (MULTI): ICD-10-CM

## 2024-04-26 DIAGNOSIS — J45.21 MILD INTERMITTENT ASTHMA WITH (ACUTE) EXACERBATION (HHS-HCC): ICD-10-CM

## 2024-04-26 DIAGNOSIS — J30.2 SEASONAL ALLERGIES: ICD-10-CM

## 2024-04-26 DIAGNOSIS — J44.9 CHRONIC OBSTRUCTIVE PULMONARY DISEASE, UNSPECIFIED COPD TYPE (MULTI): Primary | ICD-10-CM

## 2024-04-26 DIAGNOSIS — I50.9 CONGESTIVE HEART FAILURE, UNSPECIFIED HF CHRONICITY, UNSPECIFIED HEART FAILURE TYPE (MULTI): ICD-10-CM

## 2024-04-26 PROCEDURE — 86003 ALLG SPEC IGE CRUDE XTRC EA: CPT

## 2024-04-26 PROCEDURE — 71046 X-RAY EXAM CHEST 2 VIEWS: CPT

## 2024-04-26 PROCEDURE — 1159F MED LIST DOCD IN RCRD: CPT | Performed by: INTERNAL MEDICINE

## 2024-04-26 PROCEDURE — 99205 OFFICE O/P NEW HI 60 MIN: CPT | Performed by: INTERNAL MEDICINE

## 2024-04-26 PROCEDURE — 71046 X-RAY EXAM CHEST 2 VIEWS: CPT | Performed by: RADIOLOGY

## 2024-04-26 PROCEDURE — 82785 ASSAY OF IGE: CPT

## 2024-04-26 PROCEDURE — 36415 COLL VENOUS BLD VENIPUNCTURE: CPT

## 2024-04-26 RX ORDER — FUROSEMIDE 20 MG/1
20 TABLET ORAL DAILY PRN
COMMUNITY

## 2024-04-26 RX ORDER — IPRATROPIUM BROMIDE AND ALBUTEROL SULFATE 2.5; .5 MG/3ML; MG/3ML
SOLUTION RESPIRATORY (INHALATION)
COMMUNITY
Start: 2024-01-17

## 2024-04-26 RX ORDER — PANTOPRAZOLE SODIUM 20 MG/1
TABLET, DELAYED RELEASE ORAL
COMMUNITY
Start: 2024-01-29

## 2024-04-26 RX ORDER — ASPIRIN 81 MG/1
TABLET ORAL
COMMUNITY
Start: 2024-01-29

## 2024-04-26 RX ORDER — CYCLOBENZAPRINE HCL 5 MG
5 TABLET ORAL EVERY 8 HOURS PRN
COMMUNITY
Start: 2023-10-30

## 2024-04-26 RX ORDER — MULTIVITAMIN
TABLET ORAL
COMMUNITY

## 2024-04-26 RX ORDER — PRAVASTATIN SODIUM 20 MG/1
TABLET ORAL
COMMUNITY
Start: 2024-04-09

## 2024-04-26 RX ORDER — DIAZEPAM 10 MG/1
TABLET ORAL
COMMUNITY

## 2024-04-26 RX ORDER — GUAIFENESIN 600 MG/1
600 TABLET, EXTENDED RELEASE ORAL EVERY 12 HOURS PRN
COMMUNITY
Start: 2024-04-02 | End: 2024-05-02

## 2024-04-26 RX ORDER — ZINC GLUCONATE 50 MG
TABLET ORAL DAILY
COMMUNITY

## 2024-04-26 RX ORDER — OXYCODONE AND ACETAMINOPHEN 5; 325 MG/1; MG/1
TABLET ORAL
COMMUNITY
Start: 2024-01-29

## 2024-04-26 RX ORDER — FLUTICASONE PROPIONATE 50 MCG
2 SPRAY, SUSPENSION (ML) NASAL DAILY
COMMUNITY
Start: 2024-02-26

## 2024-04-27 LAB

## 2024-04-27 NOTE — PROGRESS NOTES
Subjective   Patient ID: Maritza Otoole is a 64 y.o. female who presents for COPD.  HPI  This is a 64-year-old  female who had testing done about 2 years ago for her shortness of breath.  Also was diagnosed with congestive heart failure when she saw a nurse practitioner with a cardiology group at [facility].  Reports that her breathing has been stable recently.  She is on oxygen at 2 L and was 98%.  Reports that she was in the [facility] the last week of March to the first week in April because of shortness of breath at that time.  She denies any fevers or chills.  She has had ectomy and adenoidectomy.  Her father has a history of some heart disease and she has siblings with a history of blood clots and nonspecific heart and lung issues.  Patient herself was born in [place] and has been living in [place] for the past 2-1/2 years.  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  Review of Systems  On review of systems there is been no change in her weight.  She has had a recent problem with some sore throat that seems to be improving.  She attributed this to the side effect from the all inhaler and her Trelegy-100.  She was in the hospital at the time and was treated for pneumonia and heart failure.  Her chest x-rays are currently not available to be reviewed.  She has been told that she may have a component of atrial fibrillation.  She does tend to bruise easily she is on 81 mg of aspirin per day.  She has fibromyalgia and arthritis.  She reports having had a CVA when she was 30 years old.  She experiences year-round allergy symptoms but at this time of the year is decreased.  She has had problems with shortness of breath for about 4 to 5 years.  She has also been treated for depression and anxiety.  She recently quit smoking but has been smoking 1 pack/day for 55 years.  Objective   Physical Exam  Oxygen saturation on room air rest today was 97%  HEENT the patient has a  class III airway and her chin was in good position.  She is not aware of having any snoring episodes.  Pulmonary, decreased breath sounds bilaterally but clear.  Cardio, heart sounds are regular rate and rhythm.  GI, bowel sounds were heard in all quadrants.  No tenderness on palpation of the abdomen.  Extremities she has +1 pretibial edema, no cyanosis or clubbing.  Skin, no abnormal rashes or skin lesions were noted.  Psych, the patient is alert and oriented x 3.  Assessment/Plan        Impressions:  1.  COPD by history.  2.  Dyspnea on exertion.  3.  It is unclear as to her cardiac disease at this time but she reports being told that she has congestive heart failure.  Recommendations:  1.  Complete pulmonary function testing.  2.  SST.  3.  PA and lateral chest x-ray to be done today.  4.  RAST testing to evaluate for potential allergens.  5.  Further recommendations pending the results of the testing.  6.  Follow-up with the patient in 2 weeks.  7.  We will also discuss her need for continuous oxygen because she tells me that with any activity if she is not using the oxygen her oxygen saturations are in the 80s.  Hopefully the above testing will resolve some of those issues.      This note was transcribed using the Dragon Dictation system.  There may be grammatical, punctuation, or verbiage errors that occur with voice recognition programs..    Randy Grant DO 04/27/24 1:40 PM

## 2024-04-29 ENCOUNTER — HOSPITAL ENCOUNTER (OUTPATIENT)
Dept: RESPIRATORY THERAPY | Facility: HOSPITAL | Age: 64
Discharge: HOME | End: 2024-04-29
Payer: MEDICARE

## 2024-04-29 ENCOUNTER — HOSPITAL ENCOUNTER (OUTPATIENT)
Dept: CARDIOLOGY | Facility: HOSPITAL | Age: 64
Discharge: HOME | End: 2024-04-29
Payer: MEDICARE

## 2024-04-29 DIAGNOSIS — J44.9 CHRONIC OBSTRUCTIVE PULMONARY DISEASE, UNSPECIFIED COPD TYPE (MULTI): ICD-10-CM

## 2024-04-29 PROCEDURE — 94726 PLETHYSMOGRAPHY LUNG VOLUMES: CPT

## 2024-05-07 ENCOUNTER — OFFICE VISIT (OUTPATIENT)
Dept: PULMONOLOGY | Facility: CLINIC | Age: 64
End: 2024-05-07
Payer: MEDICARE

## 2024-05-07 VITALS
SYSTOLIC BLOOD PRESSURE: 116 MMHG | HEIGHT: 67 IN | DIASTOLIC BLOOD PRESSURE: 77 MMHG | TEMPERATURE: 97.5 F | BODY MASS INDEX: 24.01 KG/M2 | WEIGHT: 153 LBS | HEART RATE: 89 BPM | OXYGEN SATURATION: 94 %

## 2024-05-07 DIAGNOSIS — J43.2 CENTRILOBULAR EMPHYSEMA (MULTI): Primary | ICD-10-CM

## 2024-05-07 DIAGNOSIS — R09.02 HYPOXEMIA: ICD-10-CM

## 2024-05-07 DIAGNOSIS — R06.09 DYSPNEA ON EXERTION: ICD-10-CM

## 2024-05-07 PROCEDURE — 1159F MED LIST DOCD IN RCRD: CPT | Performed by: INTERNAL MEDICINE

## 2024-05-07 PROCEDURE — 99214 OFFICE O/P EST MOD 30 MIN: CPT | Performed by: INTERNAL MEDICINE

## 2024-05-08 NOTE — PROGRESS NOTES
Subjective   Patient ID: Maritza Otoole is a 64 y.o. female who presents for No chief complaint on file..  HPI  Patient was seen today in the office on a 2-week follow-up visit.  She reports that overall her breathing has been doing good.  She is on 3 L of oxygen with walking and and on room air at rest today she is 94%.  She is also on Trelegy-100 daily and has DuoNeb treatments available if she needs them.  He is currently obtaining her oxygen through [employer].  She denies any cough or sputum production and only has some intermittent wheezing.  She reports some shortness of breath with ADLs.  Her chest x-ray from 4/27/2024 shows evidence of emphysematous changes in the lungs.  Her RAST testing showed no allergens.  Her 6-minute walk test showed that the patient needed supplemental oxygen at 3 L/min with walking for 3 minutes.  Her pulmonary function study shows severe obstructive airway disease.  Her lung volumes indicate air trapping.  She has a moderate reduction in gas transfer all of which would be consistent with severe emphysema.  Review of Systems  Patient denies having any fever, chills, rhinitis or sore throat.  Objective   Physical Exam  HEENT, there is no evidence of inflammation on examination of the oropharynx.  Pulmonary, decreased breath sounds bilaterally.  Cardio, heart sounds were regular rate and rhythm.  Extremities, no cyanosis, clubbing or pretibial edema.  Assessment/Plan        Impressions:  1.  Severe emphysema.  2.  Dyspnea on exertion.  3.  Hypoxemia with activity.    Recommendations:  1.  Continue with her current bronchodilator therapy.  2.  Supplemental oxygen at 3 L with walking based on the results from the 6-minute test done at the hospital.  3.  Follow-up with the patient in 2 months to reevaluate how she is doing at that time.      This note was transcribed using the Dragon Dictation system.  There may be grammatical, punctuation, or verbiage errors that occur with voice recognition  programs.    Randy Grant DO 05/08/24 7:10 AM

## 2024-06-17 ENCOUNTER — APPOINTMENT (OUTPATIENT)
Dept: PULMONOLOGY | Facility: CLINIC | Age: 64
End: 2024-06-17
Payer: MEDICARE

## 2024-06-17 VITALS
DIASTOLIC BLOOD PRESSURE: 72 MMHG | BODY MASS INDEX: 24.48 KG/M2 | WEIGHT: 156 LBS | SYSTOLIC BLOOD PRESSURE: 112 MMHG | HEART RATE: 64 BPM | HEIGHT: 67 IN | OXYGEN SATURATION: 97 % | TEMPERATURE: 98.2 F

## 2024-06-17 DIAGNOSIS — Z99.81 HYPOXEMIA REQUIRING SUPPLEMENTAL OXYGEN: ICD-10-CM

## 2024-06-17 DIAGNOSIS — R06.09 DYSPNEA ON EXERTION: ICD-10-CM

## 2024-06-17 DIAGNOSIS — R09.02 HYPOXEMIA REQUIRING SUPPLEMENTAL OXYGEN: ICD-10-CM

## 2024-06-17 DIAGNOSIS — J43.2 CENTRILOBULAR EMPHYSEMA (MULTI): Primary | ICD-10-CM

## 2024-06-17 PROCEDURE — 1036F TOBACCO NON-USER: CPT | Performed by: INTERNAL MEDICINE

## 2024-06-17 PROCEDURE — 1159F MED LIST DOCD IN RCRD: CPT | Performed by: INTERNAL MEDICINE

## 2024-06-17 PROCEDURE — 99214 OFFICE O/P EST MOD 30 MIN: CPT | Performed by: INTERNAL MEDICINE

## 2024-09-16 ENCOUNTER — APPOINTMENT (OUTPATIENT)
Dept: PULMONOLOGY | Facility: CLINIC | Age: 64
End: 2024-09-16
Payer: MEDICARE

## 2024-09-16 VITALS
OXYGEN SATURATION: 99 % | WEIGHT: 155 LBS | HEART RATE: 67 BPM | TEMPERATURE: 97.7 F | SYSTOLIC BLOOD PRESSURE: 112 MMHG | BODY MASS INDEX: 24.33 KG/M2 | HEIGHT: 67 IN | DIASTOLIC BLOOD PRESSURE: 76 MMHG

## 2024-09-16 DIAGNOSIS — J43.2 CENTRILOBULAR EMPHYSEMA (MULTI): Primary | ICD-10-CM

## 2024-09-16 DIAGNOSIS — R09.02 HYPOXEMIA: ICD-10-CM

## 2024-09-16 DIAGNOSIS — R06.09 DYSPNEA ON EXERTION: ICD-10-CM

## 2024-09-16 PROCEDURE — 99214 OFFICE O/P EST MOD 30 MIN: CPT | Performed by: INTERNAL MEDICINE

## 2024-09-16 PROCEDURE — 1036F TOBACCO NON-USER: CPT | Performed by: INTERNAL MEDICINE

## 2024-09-16 PROCEDURE — 1159F MED LIST DOCD IN RCRD: CPT | Performed by: INTERNAL MEDICINE

## 2024-09-16 PROCEDURE — 3008F BODY MASS INDEX DOCD: CPT | Performed by: INTERNAL MEDICINE

## 2024-09-16 RX ORDER — ATENOLOL 25 MG/1
0.5 TABLET ORAL
COMMUNITY
Start: 2024-09-06

## 2024-09-16 RX ORDER — ESCITALOPRAM OXALATE 10 MG/1
10 TABLET ORAL DAILY
COMMUNITY

## 2024-09-16 RX ORDER — HYDROXYZINE HYDROCHLORIDE 25 MG/1
TABLET, FILM COATED ORAL
COMMUNITY
Start: 2024-06-07

## 2024-09-16 NOTE — PROGRESS NOTES
Subjective   Patient ID: Maritza Otoole is a 64 y.o. female who presents for No chief complaint on file..  HPI  Patient was seen today in the office on a 3-month follow-up visit.  She reports to me that she is moving to live with her family in [place] next month.  She is currently using oxygen at 2.5 L continuous flow with activity.  She often does not wear the oxygen at rest.  Her saturation was 99% at rest on the 2.5 L.  She is also on Trelegy 100 daily, albuterol inhaler 2 puffs 4 times daily as needed shortness of breath and DuoNeb treatments twice daily as needed shortness of breath.  She denies any significant coughing but does admit to some clear sputum production.  She reports that she still occasionally uses the Mucinex 600 mg tablets and I told her that that is really the only time that she needs to take him as if she is having trouble with expectorating secretions.  She denies any hemoptysis or wheezing.  Review of Systems  Patient denies any fever, chills, rhinitis or sore throat.  She is not smoking and there is nobody smoking at home.  Objective   Physical Exam  HEENT, there is no inflammation noted on examination of the oropharynx.  Pulmonary, decreased breath sounds bilaterally but clear.  Cardio, heart sounds are regular rate and rhythm.  Extremities, no cyanosis, clubbing or pretibial edema.  Psych, the patient is alert and oriented x 3.  She is not in any respiratory distress while seen in the office today  Assessment/Plan        Impressions:  1.  Severe emphysema  2.  Dyspnea on exertion.  3.  Oxygen saturation on room air at rest was 95 to 96%.  Recommendations:  1.  Based on her findings today she should not need to use continuous flow oxygen at rest.  2.  I suggested that with her move to [place] that she have her oxygen rechecked with activity and have the local physician determine whether  there is still need for her to be on oxygen.  3.  Continue with the Trelegy-100 daily.  4.   Albuterol inhaler 2 puffs 4 times daily as needed shortness of breath.      This note was transcribed using the Dragon Dictation system.  There may be grammatical, punctuation, or verbiage errors that occur with voice recognition programs.    Randy Grant,  09/16/24 8:25 AM    5